# Patient Record
Sex: FEMALE | Race: WHITE | Employment: UNEMPLOYED | URBAN - METROPOLITAN AREA
[De-identification: names, ages, dates, MRNs, and addresses within clinical notes are randomized per-mention and may not be internally consistent; named-entity substitution may affect disease eponyms.]

---

## 2024-04-23 NOTE — PROGRESS NOTES
[facility] PRE-OPERATIVE INSTRUCTIONS       DOS: __5/3/2024__        Pre-Op Instructions     [x]  A History and Physical will be required within 30 days prior to surgery date. Some patients may require cardiac or pulmonary clearance. H&P will be completed DOS for Endo/colonoscopy patients.     [x]  Reviewed Medical and Surgical history, medication list, confirmed with patient any implants, allergies, bleeding disorders, AMALIA and reactions to Anesthesia.    [x]  If there is a change in physical condition between now and the day of surgery, please notify your surgeon. This includes a cough, cold, fever, sore throat, nausea, vomiting and diarrhea. Also notify your surgeon if you experience dizziness, shortness of breath or blurred vision.    [x] Reviewed hx of C-Diff, MRSA, VRE and/or recent use of Antibiotics     [x]  All patients having a procedure must have a ride home by a responsible person that is over the age of 18 and ensure it is someone that we can share medical information with. After discharge, a responsible adult needs to stay with you for 24 hours. There is a limit of 2 adult visitors per room.     If unable to secure ride and/or care taker, please contact surgeon's office.      [x]  No alcohol, smoking or marijuana use 24 hours prior to surgery. Any use of recreational drugs must be stopped 5 days prior to surgery.     [x]  NPO after midnight (Any heart, BP, seizure, thyroid and breathing medications are okay to take the morning of surgery with a small sip of water).    The morning of surgery, you may brush your teeth, just no swallowing water. Also, NO gum, candy, mints or ice chips.    [x]  For Colonoscopy's, follow prep-instructions as indicated by physician.     []  Patients with a insulin pump, keep set on basal rate on day of surgery. Long-acting insulin should be administered the evening before, take only half of usual dose. Always check with prescribing doctor if there are any  questions.     [] GLP-1 inhibitor medications should be stopped 7 days prior to surgery.    [x]  You should shower the night before or the morning of surgery with an antibacterial soap i.e. safeguard or dial. Your surgeon may require you to use Hibiclens (an antiseptic skin cleanser) please follow physician’s instructions.     []  Do not shave or wax operative site 96 hours (4 days) prior to procedure.      [x]  No jewelry including body piercings, no makeup, or nail polish. No lotion, powders, creams, perfumes or metal hairclips.     [x]  Dress in lose comfortable clothing. Leave all valuables at home.    []  Please contact your surgeon if you are taking blood thinners, aspirin, Advil, anti-inflammatory or vitamins. They may require you to stop taking them 5-7 days prior.             ITEMS TO BRING TO THE HOSPITAL ON DOS:     []  Your prescribed rescue inhaler     [x]  ID and insurance card, form of payment if have co-pay, current medication list, living will and POA (if you have one).     []  Home c-pap and/or home O2 oxygen tank.      []  Any assistive medical devices (i.e.Walker, cane, wheelchair, etc.) or immobilizer or sling needed postoperatively      [x]  You may bring dentures, glasses, contacts, and/or hearing aids, however, they will need to be removed before your procedure. Please bring cases to store them in for safekeeping and leave them with the person you came with.        Our goal is to provide you with safe and excellent care. Please contact pre-admission testing if you have any further questions. (Please note, these are generalized instructions for all surgical cases. You may be provided with more specific instructions according to your surgeon.)     [address] [place] [address]     Hospital: [phone]    Pre-Admission [phone]

## 2024-04-30 ENCOUNTER — ANESTHESIA EVENT (OUTPATIENT)
Age: 39
End: 2024-04-30
Payer: COMMERCIAL

## 2024-05-03 ENCOUNTER — ANESTHESIA (OUTPATIENT)
Age: 39
End: 2024-05-03
Payer: COMMERCIAL

## 2024-05-03 ENCOUNTER — HOSPITAL ENCOUNTER (OUTPATIENT)
Age: 39
Setting detail: OUTPATIENT SURGERY
Discharge: HOME | End: 2024-05-03
Attending: INTERNAL MEDICINE | Admitting: INTERNAL MEDICINE
Payer: COMMERCIAL

## 2024-05-03 VITALS
SYSTOLIC BLOOD PRESSURE: 114 MMHG | HEIGHT: 64 IN | BODY MASS INDEX: 23.9 KG/M2 | WEIGHT: 140 LBS | HEART RATE: 70 BPM | DIASTOLIC BLOOD PRESSURE: 77 MMHG | RESPIRATION RATE: 15 BRPM | TEMPERATURE: 98 F | OXYGEN SATURATION: 99 %

## 2024-05-03 DIAGNOSIS — K62.5 RECTAL BLEEDING: ICD-10-CM

## 2024-05-03 LAB — HCG, PREGNANCY URINE (POC): NEGATIVE

## 2024-05-03 PROCEDURE — 2580000003 HC RX 258: Performed by: ANESTHESIOLOGY

## 2024-05-03 PROCEDURE — 3700000000 HC ANESTHESIA ATTENDED CARE: Performed by: INTERNAL MEDICINE

## 2024-05-03 PROCEDURE — 2709999900 HC NON-CHARGEABLE SUPPLY: Performed by: INTERNAL MEDICINE

## 2024-05-03 PROCEDURE — 2580000003 HC RX 258: Performed by: NURSE ANESTHETIST, CERTIFIED REGISTERED

## 2024-05-03 PROCEDURE — 7100000010 HC PHASE II RECOVERY - FIRST 15 MIN: Performed by: INTERNAL MEDICINE

## 2024-05-03 PROCEDURE — 81025 URINE PREGNANCY TEST: CPT

## 2024-05-03 PROCEDURE — 3700000001 HC ADD 15 MINUTES (ANESTHESIA): Performed by: INTERNAL MEDICINE

## 2024-05-03 PROCEDURE — 88305 TISSUE EXAM BY PATHOLOGIST: CPT

## 2024-05-03 PROCEDURE — 6360000002 HC RX W HCPCS: Performed by: NURSE ANESTHETIST, CERTIFIED REGISTERED

## 2024-05-03 PROCEDURE — 3609010600 HC COLONOSCOPY POLYPECTOMY SNARE/COLD BIOPSY: Performed by: INTERNAL MEDICINE

## 2024-05-03 PROCEDURE — 7100000011 HC PHASE II RECOVERY - ADDTL 15 MIN: Performed by: INTERNAL MEDICINE

## 2024-05-03 RX ORDER — SODIUM CHLORIDE 0.9 % (FLUSH) 0.9 %
5-40 SYRINGE (ML) INJECTION PRN
Status: DISCONTINUED | OUTPATIENT
Start: 2024-05-03 | End: 2024-05-03 | Stop reason: HOSPADM

## 2024-05-03 RX ORDER — PROPOFOL 10 MG/ML
INJECTION, EMULSION INTRAVENOUS PRN
Status: DISCONTINUED | OUTPATIENT
Start: 2024-05-03 | End: 2024-05-03 | Stop reason: SDUPTHER

## 2024-05-03 RX ORDER — SODIUM CHLORIDE 9 MG/ML
INJECTION, SOLUTION INTRAVENOUS CONTINUOUS PRN
Status: DISCONTINUED | OUTPATIENT
Start: 2024-05-03 | End: 2024-05-03 | Stop reason: SDUPTHER

## 2024-05-03 RX ORDER — SODIUM CHLORIDE 0.9 % (FLUSH) 0.9 %
5-40 SYRINGE (ML) INJECTION EVERY 12 HOURS SCHEDULED
Status: DISCONTINUED | OUTPATIENT
Start: 2024-05-03 | End: 2024-05-03 | Stop reason: HOSPADM

## 2024-05-03 RX ORDER — NALOXONE HYDROCHLORIDE 0.4 MG/ML
INJECTION, SOLUTION INTRAMUSCULAR; INTRAVENOUS; SUBCUTANEOUS PRN
Status: DISCONTINUED | OUTPATIENT
Start: 2024-05-03 | End: 2024-05-03 | Stop reason: HOSPADM

## 2024-05-03 RX ORDER — SODIUM CHLORIDE 9 MG/ML
INJECTION, SOLUTION INTRAVENOUS PRN
Status: DISCONTINUED | OUTPATIENT
Start: 2024-05-03 | End: 2024-05-03 | Stop reason: HOSPADM

## 2024-05-03 RX ORDER — ONDANSETRON 2 MG/ML
4 INJECTION INTRAMUSCULAR; INTRAVENOUS
Status: DISCONTINUED | OUTPATIENT
Start: 2024-05-03 | End: 2024-05-03 | Stop reason: HOSPADM

## 2024-05-03 RX ORDER — LIDOCAINE HYDROCHLORIDE 20 MG/ML
INJECTION, SOLUTION INTRAVENOUS PRN
Status: DISCONTINUED | OUTPATIENT
Start: 2024-05-03 | End: 2024-05-03 | Stop reason: SDUPTHER

## 2024-05-03 RX ADMIN — SODIUM CHLORIDE: 9 INJECTION, SOLUTION INTRAVENOUS at 12:30

## 2024-05-03 RX ADMIN — PROPOFOL 50 MG: 10 INJECTION, EMULSION INTRAVENOUS at 12:40

## 2024-05-03 RX ADMIN — LIDOCAINE HYDROCHLORIDE 60 MG: 20 INJECTION, SOLUTION INTRAVENOUS at 12:34

## 2024-05-03 RX ADMIN — PROPOFOL 200 MCG/KG/MIN: 10 INJECTION, EMULSION INTRAVENOUS at 12:35

## 2024-05-03 RX ADMIN — PROPOFOL 100 MG: 10 INJECTION, EMULSION INTRAVENOUS at 12:34

## 2024-05-03 RX ADMIN — SODIUM CHLORIDE: 9 INJECTION, SOLUTION INTRAVENOUS at 11:46

## 2024-05-03 ASSESSMENT — ENCOUNTER SYMPTOMS: SHORTNESS OF BREATH: 0

## 2024-05-03 ASSESSMENT — PAIN SCALES - GENERAL: PAINLEVEL_OUTOF10: 0

## 2024-05-03 NOTE — OP NOTE
[facility]  Patient: MONI MCCALL  MRN: [id number]  : 1985  Account: [id number]  Sex at Birth: Female  Age: 38 Years  Procedure: Colonoscopy  Date: 5/3/2024  Attending Physician: TONNY REDMOND  Indications:         -  Rectal bleeding  Medications:         -  See the Anesthesia note for documentation of the administered medications  Complications:         -  No immediate complications.  Estimated Blood Loss:         -  Estimated blood loss: none.  Procedure:         - The CF colon scope was introduced through the anus and advanced to the            terminal ileum, with identification of the appendiceal orifice and ileocecal            valve.         -  The patient tolerated the procedure well.         -  The quality of the bowel preparation was good.         -  The terminal ileum, ileocecal valve, appendiceal orifice, and rectum were            photographed.  Findings:         -  A 4 mm polyp was found in the ascending colon.  The polyp was sessile.  The            polyp was removed with a cold snare.   Resection and retrieval were complete.         -  Non-bleeding hemorrhoids were found during retroflexion.  The hemorrhoids            were small.         -  The exam was otherwise without abnormality on direct and retroflexion            views.  Impression:         -  One 4 mm polyp in the ascending colon, removed with a cold snare.  Resected            and retrieved.         -  Non-bleeding hemorrhoids.         -  The examination was otherwise normal on direct and retroflexion views.  Recommendation:         -  Await pathology results.         -  Repeat colonoscopy for surveillance based on pathology results.  Procedure Code(s):         - 32384, Colonoscopy, flexible; with removal of tumor(s), polyp(s), or other            lesion(s) by snare technique  Diagnosis Code(s):         - K62.5, Hemorrhage of anus and rectum         - D12.2, Benign neoplasm of ascending colon         - K64.9, Unspecified

## 2024-05-03 NOTE — PROGRESS NOTES
Pt arrived from Endo to Preop bay 2. Reported received from endo, RN/ CRNA staff. Pt on RA, NSR, and VSS. Will continue to monitor.

## 2024-05-03 NOTE — PROGRESS NOTES
Pt is preop for colonoscopy.  She is ambulatory and otherwise healthy.  Family in the waiting room, father: Xander [phone]

## 2024-05-03 NOTE — ANESTHESIA PRE PROCEDURE
Department of Anesthesiology  Preprocedure Note       Name:  Eduarda Vigil   Age:  38 y.o.  :  1985                                          MRN:  [id number]         Date:  5/3/2024      Surgeon: Surgeon(s):  Mechelle Hernandez MD    Procedure: Procedure(s):  COLONOSCOPY    Medications prior to admission:   Prior to Admission medications    Medication Sig Start Date End Date Taking? Authorizing Provider   busPIRone (BUSPAR) 10 MG tablet Take 1 tablet by mouth 3 times daily    Kenrick Sharif MD   valACYclovir (VALTREX) 1 g tablet Take 1,000 mg by mouth 2 times daily    ProviderKenrick MD       Current medications:    No current facility-administered medications for this encounter.       Allergies:  No Known Allergies    Problem List:  There is no problem list on file for this patient.      Past Medical History:        Diagnosis Date   • Anxiety    • Cancer (HCC)     skin       Past Surgical History:        Procedure Laterality Date   • TONSILLECTOMY         Social History:    Social History     Tobacco Use   • Smoking status: Never   • Smokeless tobacco: Never   Substance Use Topics   • Alcohol use: Yes     Comment: occas                                Counseling given: Not Answered      Vital Signs (Current):   Vitals:    24 1527   Weight: 63.5 kg (140 lb)   Height: 1.626 m (5' 4\")                                              BP Readings from Last 3 Encounters:   24 101/60   17 117/78       NPO Status:                                                                                 BMI:   Wt Readings from Last 3 Encounters:   24 63.5 kg (140 lb)   24 58.5 kg (129 lb)   17 58.8 kg (129 lb 9.6 oz)     Body mass index is 24.03 kg/m².    CBC:   Lab Results   Component Value Date/Time    WBC 10.8 2024 02:22 AM    RBC 4.57 2024 02:22 AM    HGB 15.1 2024 02:22 AM    HCT 43.2 2024 02:22 AM    MCV 94.5 2024 02:22 AM    RDW 12.7 2024 02:22

## 2024-05-03 NOTE — PROGRESS NOTES
Discharge instructions review with patient and fatherilia. All home medications have been reviewed, pt v/u.

## 2024-05-03 NOTE — DISCHARGE INSTRUCTIONS

## 2024-05-03 NOTE — PROGRESS NOTES
Pt discharged via wheelchair. Pt discharged with all belongings. Father, Xander taking stable pt home.

## 2024-05-03 NOTE — H&P
[facility] ENDOSCOPY  Outpatient Procedure H&P    Patient: Eduarda Vigil MRN: [id number]     YOB: 1985  Age: 38 y.o.  Sex: female    Unit: [facility] ENDOSCOPY Room/Bed: Endo Pool/NONE Location: [facility]     Procedure: Procedure(s):  COLONOSCOPY    Indication: Pre-Op Diagnosis Codes:     * Rectal bleeding [K62.5]    Referring  Physician:          Nurses past medical history notes reviewed and agreed.   Medications reviewed.    Allergies: Patient has no known allergies.     Allergies noted: Yes     Past Medical History:   Past Medical History:   Diagnosis Date    Anxiety     Cancer (HCC)     skin       Past Surgical History:   Past Surgical History:   Procedure Laterality Date    TONSILLECTOMY         Social History:   Social History     Socioeconomic History    Marital status:      Spouse name: Not on file    Number of children: Not on file    Years of education: Not on file    Highest education level: Not on file   Occupational History    Not on file   Tobacco Use    Smoking status: Never    Smokeless tobacco: Never   Vaping Use    Vaping Use: Never used   Substance and Sexual Activity    Alcohol use: Yes     Comment: occas    Drug use: Yes     Types: Marijuana (Weed)     Comment: edible prn    Sexual activity: Yes     Partners: Male   Other Topics Concern    Not on file   Social History Narrative    Not on file     Social Determinants of Health     Financial Resource Strain: Not on file   Food Insecurity: Not on file   Transportation Needs: Not on file   Physical Activity: Not on file   Stress: Not on file   Social Connections: Not on file   Intimate Partner Violence: Not on file   Housing Stability: Not on file       Family History:   Family History   Problem Relation Age of Onset    Diabetes Maternal Grandmother        Home Medications:   Prior to Admission medications    Medication Sig Start Date End Date Taking? Authorizing Provider   busPIRone (BUSPAR) 10 MG tablet Take 1 tablet by mouth 3

## 2024-05-03 NOTE — ANESTHESIA POSTPROCEDURE EVALUATION
Department of Anesthesiology  Postprocedure Note    Patient: Eduarda Vigil  MRN: [id number]  YOB: 1985  Date of evaluation: 5/3/2024    Procedure Summary       Date: 05/03/24 Room / Location: [address]    Anesthesia Start: 1231 Anesthesia Stop: 1250    Procedure: COLONOSCOPY Diagnosis:       Rectal bleeding      (Rectal bleeding [K62.5])    Surgeons: Mechelle Hernandez MD Responsible Provider: Candelario Jolly MD    Anesthesia Type: MAC ASA Status: 2            Anesthesia Type: No value filed.    Huan Phase I: Huan Score: 10    Huan Phase II: Huan Score: 10    Anesthesia Post Evaluation    Patient location during evaluation: PACU  Level of consciousness: awake and alert  Airway patency: patent  Nausea & Vomiting: no nausea and no vomiting  Cardiovascular status: blood pressure returned to baseline  Respiratory status: acceptable  Hydration status: euvolemic  Comments: Postoperative Anesthesia Note    Name:    Eduarda Vigil  MRN:      [id number]    Patient Vitals in the past 12 hrs:  05/03/24 1315, BP:114/77, Pulse:70, Resp:15, SpO2:99 %  05/03/24 1310, BP:112/77, Temp:98 °F (36.7 °C), Pulse:73, Resp:18, SpO2:100 %  05/03/24 1305, BP:106/68, Pulse:73, Resp:18, SpO2:100 %  05/03/24 1300, BP:100/74, Pulse:89, Resp:15, SpO2:99 %  05/03/24 1255, BP:97/67, Temp:98.3 °F (36.8 °C), Temp src:Infrared, Pulse:79, Resp:16, SpO2:100 %  05/03/24 1128, BP:119/84  05/03/24 1126, BP:(!) 123/91, Temp:98.2 °F (36.8 °C), Temp src:Temporal, Pulse:75, Resp:18, SpO2:100 %     LABS:    CBC  Lab Results       Component                Value               Date/Time                  WBC                      10.8                03/20/2024 02:22 AM        HGB                      15.1                03/20/2024 02:22 AM        HCT                      43.2                03/20/2024 02:22 AM        PLT                      237                 03/20/2024 02:22 AM   RENAL  Lab

## 2024-05-06 LAB — SURGICAL PATHOLOGY REPORT: NORMAL

## (undated) DEVICE — AIR/WATER CLEANING ADAPTER FOR OLYMPUS® GI ENDOSCOPE: Brand: BULLDOG®

## (undated) DEVICE — SINGLE USE AIR/WATER, SUCTION AND BIOPSY VALVES SET: Brand: ORCAPOD™

## (undated) DEVICE — SOLUTION IRRIG 1000ML STRL H2O USP PLAS POUR BTL

## (undated) DEVICE — ENDOSCOPIC KIT 1.1+ 6 FT 2 GWN AAMI LEVEL 3

## (undated) DEVICE — SYRINGE, LUER SLIP, STERILE, 60ML: Brand: MEDLINE

## (undated) DEVICE — TUBING, SUCTION, 1/4" X 12', STRAIGHT: Brand: MEDLINE

## (undated) DEVICE — BW-412T DISP COMBO CLEANING BRUSH: Brand: SINGLE USE COMBINATION CLEANING BRUSH

## (undated) DEVICE — TRAP ENDOSCP POLYP 2 CHMBR DRAWER TYP

## (undated) DEVICE — SNARE COLD DIAMOND 10MM THIN